# Patient Record
Sex: MALE | Race: WHITE | NOT HISPANIC OR LATINO | Employment: OTHER | URBAN - METROPOLITAN AREA
[De-identification: names, ages, dates, MRNs, and addresses within clinical notes are randomized per-mention and may not be internally consistent; named-entity substitution may affect disease eponyms.]

---

## 2020-07-29 RX ORDER — SODIUM, POTASSIUM,MAG SULFATES 17.5-3.13G
2 SOLUTION, RECONSTITUTED, ORAL ORAL TAKE AS DIRECTED
Qty: 354 ML | Refills: 0 | Status: SHIPPED | OUTPATIENT
Start: 2020-07-29 | End: 2021-02-18 | Stop reason: SDUPTHER

## 2020-08-02 ENCOUNTER — APPOINTMENT (OUTPATIENT)
Dept: PREADMISSION TESTING | Facility: HOSPITAL | Age: 65
End: 2020-08-02

## 2020-08-02 PROCEDURE — C9803 HOPD COVID-19 SPEC COLLECT: HCPCS

## 2020-08-02 PROCEDURE — U0002 COVID-19 LAB TEST NON-CDC: HCPCS

## 2020-08-02 PROCEDURE — U0004 COV-19 TEST NON-CDC HGH THRU: HCPCS

## 2020-08-03 LAB
REF LAB TEST METHOD: NORMAL
SARS-COV-2 RNA RESP QL NAA+PROBE: NOT DETECTED

## 2020-08-05 ENCOUNTER — OUTSIDE FACILITY SERVICE (OUTPATIENT)
Dept: GASTROENTEROLOGY | Facility: CLINIC | Age: 65
End: 2020-08-05

## 2020-08-05 PROCEDURE — S0260 H&P FOR SURGERY: HCPCS | Performed by: INTERNAL MEDICINE

## 2020-08-05 PROCEDURE — 45380 COLONOSCOPY AND BIOPSY: CPT | Performed by: INTERNAL MEDICINE

## 2020-08-05 PROCEDURE — 45381 COLONOSCOPY SUBMUCOUS NJX: CPT | Performed by: INTERNAL MEDICINE

## 2020-08-05 PROCEDURE — 45385 COLONOSCOPY W/LESION REMOVAL: CPT | Performed by: INTERNAL MEDICINE

## 2020-08-14 DIAGNOSIS — K63.5 POLYP OF COLON, UNSPECIFIED PART OF COLON, UNSPECIFIED TYPE: Primary | ICD-10-CM

## 2020-08-14 NOTE — PROGRESS NOTES
Mary, please send patient for genetic counseling regarding family history of colon cancer and likely Brooke syndrome
High cholesterol

## 2020-09-03 ENCOUNTER — LAB (OUTPATIENT)
Dept: LAB | Facility: HOSPITAL | Age: 65
End: 2020-09-03

## 2020-09-03 ENCOUNTER — CLINICAL SUPPORT (OUTPATIENT)
Dept: GENETICS | Facility: HOSPITAL | Age: 65
End: 2020-09-03

## 2020-09-03 DIAGNOSIS — Z80.0 FAMILY HISTORY OF COLON CANCER: ICD-10-CM

## 2020-09-03 DIAGNOSIS — Z13.79 GENETIC TESTING: Primary | ICD-10-CM

## 2020-09-03 DIAGNOSIS — Z80.3 FAMILY HISTORY OF BREAST CANCER: ICD-10-CM

## 2020-09-03 DIAGNOSIS — Z85.820 HISTORY OF MELANOMA: ICD-10-CM

## 2020-09-03 PROCEDURE — 96040: CPT | Performed by: GENETIC COUNSELOR, MS

## 2020-09-03 NOTE — PROGRESS NOTES
Cornelio Pednleton is a 65-year-old male who was referred for genetic counseling due to a family history of colon cancer. Mr. Pendleton reports a personal history of melanoma diagnosed at age 44, as well as a history of basal cell and squamous cell skin cancers. He has colonoscopies every three to five years. He reports he has had around seven polyps removed over his lifetime. He was interested in discussing his risk for a hereditary cancer syndrome. Mr. Pendleton was interested in pursuing a multi-gene panel, and therefore the Common Hereditary Cancers panel was ordered through [facility] that analyzes 47 genes associated with an increased cancer risk. Results from this testing are expected in approximately 2-3 weeks.    FAMILY HISTORY (see attached pedigree):    Mother: Colon cancer, 45  Sister:  Colon cancer, 69  Niece:  Breast cancer, 40s  Brother: Squamous cell carcinoma, 63    We do not have medical records confirming the diagnoses in Mr. Pendleton’s family.    RISK ASSESSMENT: Mr. Pendleton’s family history of colon cancer led to concern regarding a hereditary cancer syndrome. He meets NCCN guidelines criteria for Brooke syndrome testing based on his family history of early onset colon cancer in a close relative.  In cases where an affected relative is not available for testing or not willing to pursue testing, it is appropriate to offer testing to an unaffected individual. Medicare does not cover testing for individuals who have not themselves had a cancer diagnosis; however, [facility], a genetic testing laboratory, will provide testing to Medicare patients in these circumstances when affected relatives are not available for testing.  Mr. Pendleton opted to pursue multigene panel testing via the [facility] Common Hereditary Cancers panel.   If genetic testing is negative, Mr. Pendleton’s management should be guided by family history.    GENETIC COUNSELING: (30 minutes) We reviewed the family history information in detail. Cases of  cancer follow three general patterns: sporadic, familial, and hereditary.  While most cancer is sporadic, some cases appear to occur in family clusters.  These cases are said to be familial and account for 10-20% of cancer cases.  Familial cases may be due to a combination of shared genes and environmental factors among family members.  In even fewer families, the cancer is said to be inherited, and the genes responsible for the cancer are known.      Family histories typical of hereditary cancer syndromes usually include multiple first- and second-degree relatives diagnosed with cancer types that define a syndrome. These cases tend to be diagnosed at younger-than-expected ages and can be bilateral or multifocal. The cancer in these families follows an autosomal dominant inheritance pattern, which indicates the likely presence of a mutation in a cancer susceptibility gene. Children and siblings of an individual believed to carry this mutation have a 50% chance of inheriting that mutation, thereby inheriting the increased risk to develop cancer. These mutations can be passed down from the maternal or the paternal lineage.    We discussed that the most common form of hereditary colon cancer is Brooke syndrome.  Brooke syndrome is caused by mutations in the mismatch repair genes. The lifetime risk for colon cancer for individuals with Brooke syndrome is as high as 80% if there is no intervention (i.e. removal of polyps detected on colonoscopy).  Other risks include endometrial cancer (up to 60%), as well as other cancers (ovarian, upper GI, brain, stomach, pancreatic). There are national management guidelines that have been established for individuals known to have Brooke syndrome.      We discussed there are other genes associated with increased cancer risk, including breast cancer. Some of these conditions have well defined cancer risks and established management guidelines.  Other genes that can be tested for have been  more recently described, and there may be less data regarding the risks and therefore may not have established management guidelines.  We discussed these limitations at length. Based on Mr. Pendleton’s family history of cancer and his desire to get more information regarding his personal risks he opted to pursue testing through a panel evaluating several other genes known to increase the risk for cancer.    GENETIC TESTING:  The risks, benefits and limitations of genetic testing and implications for clinical management following testing were reviewed. DNA test results can influence decisions regarding screening and prevention.  Genetic testing can have significant psychological implications for both individuals and families. Also discussed was the possibility of employment and insurance discrimination based on genetic test results and the federal and states laws that are in place to prevent this (LUÍS).         We discussed panel testing, which would involve testing 47 genes associated with increased cancer risk, including the genes associated with Brooke syndrome. The benefits and limitations of genetic testing were discussed. The implications of a positive or negative test result were discussed. We also discussed the importance of testing on an affected relative. We discussed the possibility that, in some cases, genetic test results may be ambiguous due to the identification of a genetic variant. These variants may or may not be associated with an increased cancer risk. With multigene panel testing, it is not uncommon for a variant of uncertain significance (VUS) to be identified.  If a VUS is identified, testing family members is not recommended and screening recommendations are made based on the family history. The laboratories that perform genetic testing work to reclassify the VUS and send out an amended report if and when a VUS is reclassified.  The majority of variant findings are ultimately reclassified to a  negative result. Given his family history, a negative test result does not eliminate all cancer risk, although the risk would not be as high as it would with positive genetic testing. We also discussed that some of the genes on this particular panel have not been well studied yet and there may not be clear implications or guidelines for some of the genes included on this comprehensive panel.    PLAN:  Genetic testing via the [facility] Common Hereditary Cancers panel was ordered and results are expected in 2-3 weeks. We will contact Mr. Pendleton with his results once they are received.      Kelsy Heard MS, [facility] [place] [facility]  Licensed Certified Genetic Counselor

## 2020-09-14 ENCOUNTER — DOCUMENTATION (OUTPATIENT)
Dept: GENETICS | Facility: HOSPITAL | Age: 65
End: 2020-09-14

## 2020-09-14 NOTE — PROGRESS NOTES
SUMMARY: Genetic testing was negative for known pathogenic mutations on the Common Hereditary Cancers panel. Two variants of uncertain significance were identified.     Cornelio Pendlteon is a 65-year-old male who was referred for genetic counseling due to a family history of colon cancer. Mr. Pendleton reports a personal history of melanoma diagnosed at age 44, as well as a history of basal cell and squamous cell skin cancers. He has colonoscopies every three years. He reports he has had around seven polyps removed over his lifetime. He was interested in discussing his risk for a hereditary cancer syndrome. Mr. Pendleton was interested in pursuing a multi-gene panel, and therefore the Common Hereditary Cancers panel was ordered through [facility] that analyzes 47 genes associated with an increased cancer risk. Genetic testing via the Common Hereditary Cancers panel was negative for known pathogenic mutations.  While no known pathogenic mutations were identified, variants of uncertain significance were identified in the BRCA2 gene and CTNNA1 gene. Variants are changes in DNA that may or may not affect the function of the gene. The classification of a variant to either a benign gene change or pathogenic mutation depends on a number of factors. The majority (estimated to be >90%) of VUS’s are eventually reclassified as benign gene changes. It is not recommended that any unaffected relatives be tested for these variants at this time, and management should not be altered based on these variants.  Management should be guided by family history assessments. These results were discussed with Mr. Pendleton by telephone on 9/14/2020.    FAMILY HISTORY (see attached pedigree):    Mother: Colon cancer, 45  Sister:  Colon cancer, 69  Niece:  Breast cancer, 40s  Brother: Squamous cell carcinoma, 63    We do not have medical records confirming the diagnoses in Mr. Pendleton’s family.    RISK ASSESSMENT: Mr. Pendleton’s family history of colon cancer  led to concern regarding a hereditary cancer syndrome. He meets NCCN guidelines criteria for Brooke syndrome testing based on his family history of early onset colon cancer in a close relative.  In cases where an affected relative is not available for testing or not willing to pursue testing, it is appropriate to offer testing to an unaffected individual. Medicare does not cover testing for individuals who have not themselves had a cancer diagnosis; however, [facility], a genetic testing laboratory, will provide testing to Medicare patients in these circumstances when affected relatives are not available for testing.  Mr. Pendleton opted to pursue multigene panel testing via the [facility] Common Hereditary Cancers panel.   If genetic testing is negative, Mr. Pendleton’s management should be guided by family history.    GENETIC COUNSELING: We reviewed the family history information in detail. Cases of cancer follow three general patterns: sporadic, familial, and hereditary.  While most cancer is sporadic, some cases appear to occur in family clusters.  These cases are said to be familial and account for 10-20% of cancer cases.  Familial cases may be due to a combination of shared genes and environmental factors among family members.  In even fewer families, the cancer is said to be inherited, and the genes responsible for the cancer are known.      Family histories typical of hereditary cancer syndromes usually include multiple first- and second-degree relatives diagnosed with cancer types that define a syndrome. These cases tend to be diagnosed at younger-than-expected ages and can be bilateral or multifocal. The cancer in these families follows an autosomal dominant inheritance pattern, which indicates the likely presence of a mutation in a cancer susceptibility gene. Children and siblings of an individual believed to carry this mutation have a 50% chance of inheriting that mutation, thereby inheriting the increased risk to  develop cancer. These mutations can be passed down from the maternal or the paternal lineage.    We discussed that the most common form of hereditary colon cancer is Brooke syndrome.  Brooke syndrome is caused by mutations in the mismatch repair genes. The lifetime risk for colon cancer for individuals with Brooke syndrome is as high as 80% if there is no intervention (i.e. removal of polyps detected on colonoscopy).  Other risks include endometrial cancer (up to 60%), as well as other cancers (ovarian, upper GI, brain, stomach, pancreatic). There are national management guidelines that have been established for individuals known to have Brooke syndrome.      We discussed there are other genes associated with increased cancer risk, including breast cancer. Some of these conditions have well defined cancer risks and established management guidelines.  Other genes that can be tested for have been more recently described, and there may be less data regarding the risks and therefore may not have established management guidelines.  We discussed these limitations at length. Based on Mr. Pendleton’s family history of cancer and his desire to get more information regarding his personal risks he opted to pursue testing through a panel evaluating several other genes known to increase the risk for cancer.    GENETIC TESTING:  The risks, benefits and limitations of genetic testing and implications for clinical management following testing were reviewed. DNA test results can influence decisions regarding screening and prevention.  Genetic testing can have significant psychological implications for both individuals and families. Also discussed was the possibility of employment and insurance discrimination based on genetic test results and the federal and states laws that are in place to prevent this (LUÍS).         We discussed panel testing, which would involve testing 47 genes associated with increased cancer risk, including the  genes associated with Brooke syndrome. The benefits and limitations of genetic testing were discussed. The implications of a positive or negative test result were discussed. We also discussed the importance of testing on an affected relative. We discussed the possibility that, in some cases, genetic test results may be ambiguous due to the identification of a genetic variant. These variants may or may not be associated with an increased cancer risk. With multigene panel testing, it is not uncommon for a variant of uncertain significance (VUS) to be identified.  If a VUS is identified, testing family members is not recommended and screening recommendations are made based on the family history. The laboratories that perform genetic testing work to reclassify the VUS and send out an amended report if and when a VUS is reclassified.  The majority of variant findings are ultimately reclassified to a negative result. Given his family history, a negative test result does not eliminate all cancer risk, although the risk would not be as high as it would with positive genetic testing.     TEST RESULTS:  Genetic testing was negative for known pathogenic mutations by sequencing and rearrangement testing for the genes on the Common Hereditary Cancers panel.  Variants of uncertain significance (VUS) were identified in the BRCA2 gene and CTNNA1 gene, however the majority of VUS’s are ultimately reclassified as benign, therefore this result should not be used in making management decisions. If these variants are ever reclassified a new report will be issued by the laboratory and released directly to the ordering physician, and our office. We are unable to determine why Mr. Pendleton or his relatives have developed cancer at this time. It is possible that there is a mutation present in the family that Mr. Pendleton did not happen to inherit. This assessment is based on the information provided at the time of the consultation.      CLINICAL MANAGEMENT GUIDELINES:  Per NCCN guidelines, individuals who have a first-degree relative diagnosed with colorectal cancer at any age should begin colonoscopy screening at age 40 or ten years before the earliest diagnosis of colorectal cancer in the family, whichever is earliest, and repeat every five years or more frequently based on personal clinical findings. This assessment is based on the information provided at the time of consultation and could change should new information be obtained regarding his personal and/or family history.      PLAN:  Genetic counseling remains available to Mr. Pendleton and his family. He is welcome to contact us with any questions or concerns at [phone].      Kelsy Heard MS, [facility] [place], [facility]  Licensed Certified Genetic Counselor       Cc: Cornelio Jack MD

## 2021-02-18 RX ORDER — SODIUM, POTASSIUM,MAG SULFATES 17.5-3.13G
2 SOLUTION, RECONSTITUTED, ORAL ORAL TAKE AS DIRECTED
Qty: 354 ML | Refills: 0 | Status: SHIPPED | OUTPATIENT
Start: 2021-02-18

## 2021-02-28 ENCOUNTER — APPOINTMENT (OUTPATIENT)
Dept: PREADMISSION TESTING | Facility: HOSPITAL | Age: 66
End: 2021-02-28

## 2021-02-28 PROCEDURE — C9803 HOPD COVID-19 SPEC COLLECT: HCPCS

## 2021-02-28 PROCEDURE — U0004 COV-19 TEST NON-CDC HGH THRU: HCPCS

## 2021-03-01 LAB — SARS-COV-2 RNA RESP QL NAA+PROBE: NOT DETECTED

## 2021-03-02 ENCOUNTER — OUTSIDE FACILITY SERVICE (OUTPATIENT)
Dept: GASTROENTEROLOGY | Facility: CLINIC | Age: 66
End: 2021-03-02

## 2021-03-02 PROCEDURE — 45385 COLONOSCOPY W/LESION REMOVAL: CPT | Performed by: INTERNAL MEDICINE

## 2021-03-02 PROCEDURE — 88305 TISSUE EXAM BY PATHOLOGIST: CPT | Performed by: INTERNAL MEDICINE

## 2021-03-03 ENCOUNTER — LAB REQUISITION (OUTPATIENT)
Dept: LAB | Facility: HOSPITAL | Age: 66
End: 2021-03-03

## 2021-03-03 DIAGNOSIS — Z12.11 ENCOUNTER FOR SCREENING FOR MALIGNANT NEOPLASM OF COLON: ICD-10-CM

## 2021-03-06 LAB
CYTO UR: NORMAL
LAB AP CASE REPORT: NORMAL
LAB AP CLINICAL INFORMATION: NORMAL
PATH REPORT.FINAL DX SPEC: NORMAL
PATH REPORT.GROSS SPEC: NORMAL